# Patient Record
Sex: MALE | Employment: FULL TIME | URBAN - METROPOLITAN AREA
[De-identification: names, ages, dates, MRNs, and addresses within clinical notes are randomized per-mention and may not be internally consistent; named-entity substitution may affect disease eponyms.]

---

## 2021-01-18 ENCOUNTER — MYC MEDICAL ADVICE (OUTPATIENT)
Dept: DERMATOLOGY | Facility: CLINIC | Age: 51
End: 2021-01-18

## 2021-01-18 ENCOUNTER — TELEPHONE (OUTPATIENT)
Dept: DERMATOLOGY | Facility: CLINIC | Age: 51
End: 2021-01-18

## 2021-01-18 ENCOUNTER — APPOINTMENT (OUTPATIENT)
Dept: URGENT CARE | Facility: CLINIC | Age: 51
End: 2021-01-18
Payer: COMMERCIAL

## 2021-01-18 NOTE — TELEPHONE ENCOUNTER
[facility] Call Center    Phone Message    May a detailed message be left on voicemail: yes     Reason for Call: Other: `      Pt diagnosed with Psoriasis and Eczema in 3/2020. Pt did not take action at that time. Pt states he is in a lot of discomfort due to the itching he is experiencing.    I was able to get him an appt on 2/17/21, but with the itching symptoms, Pt requests to be seen sooner.    Pt will upload photos of affected areas by [facility].    Please call Pt to let him know if it is possible to be seen sooner.    Thank you.      Action Taken: Message routed to:  [facility] ([facility]  Derm    Travel Screening: Not Applicable

## 2021-01-18 NOTE — TELEPHONE ENCOUNTER
Phone call to patient. Moved appointment up to this Wednesday, January 20th at 8:30 for a telephone visit with Minal Mae PA-C. Verifico message sent for photos.      Columba Hackett LPN

## 2021-01-20 ENCOUNTER — VIRTUAL VISIT (OUTPATIENT)
Dept: DERMATOLOGY | Facility: CLINIC | Age: 51
End: 2021-01-20
Payer: COMMERCIAL

## 2021-01-20 DIAGNOSIS — L40.0 PLAQUE PSORIASIS: Primary | ICD-10-CM

## 2021-01-20 PROCEDURE — 99203 OFFICE O/P NEW LOW 30 MIN: CPT | Mod: 95 | Performed by: PHYSICIAN ASSISTANT

## 2021-01-20 RX ORDER — TRIAMCINOLONE ACETONIDE 1 MG/G
OINTMENT TOPICAL 2 TIMES DAILY
Qty: 160 G | Refills: 4 | Status: SHIPPED | OUTPATIENT
Start: 2021-01-20

## 2021-01-20 RX ORDER — METFORMIN HCL 500 MG
1000 TABLET, EXTENDED RELEASE 24 HR ORAL
COMMUNITY
Start: 2020-11-10

## 2021-01-20 NOTE — PROGRESS NOTES
"[facility] Dermatology Note  Encounter Date: Jan 20, 2021  [employer] and Telephone ([phone]). Location of teledermatologist: [facility]. Start time: Length of call: 22min.    Dermatology Problem List:  1. Plaque Psoriasis  -triamcinolone 0.1% ointment, hydrocortisone 1% cream  2. PMhx - DM - metformin  _________________________________________    Assessment & Plan:  # Psoriasis. >5% BSA  -continue cerave cleanser  -triamcinolone 0.1% ointment BID  -hydrocortisone 0.1% cream to AA on face  -edu on etiology PSO, relationship with DM  -discuss other treatments in a stepwise fashion depending on how patient responds to the topical steroids. Can consider adding calciurin inhibitors next as well as otezla or biologics, possibly methotrexate pending insurance approval.   -steroid edu given    Procedures Performed:    None    Follow-up: 3 month(s) in-person, or earlier for new or changing lesions    Staff:     All risks, benefits and alternatives were discussed with patient.  Patient is in agreement and understands the assessment and plan.  All questions were answered.    Minal Mae PA-C, MPAS  [facility]: Phone: [phone], Fax: [phone]  [facility]: Phone: [phone],  Fax: [phone]  ____________________________________________    CC: PSO, itchy    HPI:  Mr. Porfirio Knox is a(n) 50 year old male who presents today as a new patient for rash. Dx as PSO vs dermatitis NOS by previous dermatologist via telemedicine visit. Noticed first initially March 2020. Was given calcipotriene solution and cream to use on the patches but did not pick it up due to cost. Continues to be itchy. Has been using plain chapstick on his eyelids. Currently using \"Natralia\" cream, orders it from [employer], says it is used for eczema and psoriasis, Uses Chapstick on eyelids - only thing " "that doesn't irritate them, Uses Vaseline on ears. Previously declined topical steroid use from Dr. Redding. Hx DM, on metformin.     Father with hx of PSO.    Patient is otherwise feeling well, without additional concerns.    Labs:  NA    Physical Exam:  Vitals: There were no vitals taken for this visit.  SKIN: Teledermatology photos were reviewed; image quality and interpretability: acceptable. Image date: see  upload date.  - erythematous scaly plaques on the right and left lower leg and bilateral elbows, thighs.  - Presumably his eyelids as he has been treating them  - No other lesions of concern on areas examined.     Medications:  No current outpatient medications on file.     No current facility-administered medications for this visit.       Past Medical/Surgical History:   There is no problem list on file for this patient.    No past medical history on file.    CC Dr. cMdonald  No address on file on close of this encounter.        Teledermatology Nurse Call Patients:     Are you in the [facility] at the time of the encounter? yes    Today's visit will be billed to you and your insurance.    A teledermatology visit is not as thorough as an in-person visit and the quality of the photograph sent may not be of the same quality as that taken by the dermatology clinic.      Patient summary of issue: Psoriasis/eczema diagnosed early in 2020, but did not act on it - records in [facility]   Location of problem on body: Bilateral legs and left elbow  How long has area/symptoms been present: Early 2020   What makes it better?: \"Natralia\", Cerave body wash specific to psoriasis   What makes it worse?: Showers make the itchiness worse, charcoal based body wash  Other symptoms include the following: Itchiness- most intense after a shower, scaling   Which medications have been tried, for how long, and did they make it better or worse (ex. Triamcinolone, used twice daily for 2 weeks, not " "improved): \"Natralia\", plain Chapstick on eyelids, Vaseline on ears  The patient has seen a dermatologist.   The patient has no past medical history of skin cancer  ROS: The patient is generally feeling well.     -Currently using \"Natralia\" - orders it from [employer], says it is used for eczema and psoriasis   -Uses Chapstick on eyelids - only thing that doesn't irritate them  -Uses Vaseline on ears        Columba Hackett LPN    "

## 2021-01-20 NOTE — LETTER
1/20/2021         RE: Porfirio Knox  [address]  [place] [address]        Dear Colleague,    Thank you for referring your patient, Porfirio Knox, to the [facility]. Please see a copy of my visit note below.    [facility] Dermatology Note  Encounter Date: Jan 20, 2021  [employer] and Telephone ([phone]). Location of teledermatologist: [facility]. Start time: Length of call: 22min.    Dermatology Problem List:  1. Plaque Psoriasis  -triamcinolone 0.1% ointment, hydrocortisone 1% cream  2. PMhx - DM - metformin  _________________________________________    Assessment & Plan:  # Psoriasis. >5% BSA  -continue cerave cleanser  -triamcinolone 0.1% ointment BID  -hydrocortisone 0.1% cream to AA on face  -edu on etiology PSO, relationship with DM  -discuss other treatments in a stepwise fashion depending on how patient responds to the topical steroids. Can consider adding calciurin inhibitors next as well as otezla or biologics, possibly methotrexate pending insurance approval.   -steroid edu given    Procedures Performed:    None    Follow-up: 3 month(s) in-person, or earlier for new or changing lesions    Staff:     All risks, benefits and alternatives were discussed with patient.  Patient is in agreement and understands the assessment and plan.  All questions were answered.    Minal Mae PA-C, MPAS  [facility]: Phone: [phone], Fax: [phone]  [facility]: Phone: [phone],  Fax: [phone]  ____________________________________________    CC: PSO, itchy    HPI:  Mr. Porfirio Knox is a(n) 50 year old male who presents today as a new patient for rash. Dx as PSO vs dermatitis NOS by previous dermatologist via telemedicine visit. Noticed first initially March 2020. Was given calcipotriene solution and cream to use on  "the patches but did not pick it up due to cost. Continues to be itchy. Has been using plain chapstick on his eyelids. Currently using \"Natralia\" cream, orders it from [employer], says it is used for eczema and psoriasis, Uses Chapstick on eyelids - only thing that doesn't irritate them, Uses Vaseline on ears. Previously declined topical steroid use from Dr. Redding. Hx DM, on metformin.     Father with hx of PSO.    Patient is otherwise feeling well, without additional concerns.    Labs:  NA    Physical Exam:  Vitals: There were no vitals taken for this visit.  SKIN: Teledermatology photos were reviewed; image quality and interpretability: acceptable. Image date: see  upload date.  - erythematous scaly plaques on the right and left lower leg and bilateral elbows, thighs.  - Presumably his eyelids as he has been treating them  - No other lesions of concern on areas examined.     Medications:  No current outpatient medications on file.     No current facility-administered medications for this visit.       Past Medical/Surgical History:   There is no problem list on file for this patient.    No past medical history on file.    CC Dr. Mcdonald  No address on file on close of this encounter.        Teledermatology Nurse Call Patients:     Are you in the [facility] at the time of the encounter? yes    Today's visit will be billed to you and your insurance.    A teledermatology visit is not as thorough as an in-person visit and the quality of the photograph sent may not be of the same quality as that taken by the dermatology clinic.      Patient summary of issue: Psoriasis/eczema diagnosed early in 2020, but did not act on it - records in [facility]   Location of problem on body: Bilateral legs and left elbow  How long has area/symptoms been present: Early 2020   What makes it better?: \"Natralia\", Cerave body wash specific to psoriasis   What makes it worse?: Showers make the itchiness worse, " "charcoal based body wash  Other symptoms include the following: Itchiness- most intense after a shower, scaling   Which medications have been tried, for how long, and did they make it better or worse (ex. Triamcinolone, used twice daily for 2 weeks, not improved): \"Natralia\", plain Chapstick on eyelids, Vaseline on ears  The patient has seen a dermatologist.   The patient has no past medical history of skin cancer  ROS: The patient is generally feeling well.     -Currently using \"Natralia\" - orders it from [employer], says it is used for eczema and psoriasis   -Uses Chapstick on eyelids - only thing that doesn't irritate them  -Uses Vaseline on ears        Columba Hackett LPN        Again, thank you for allowing me to participate in the care of your patient.        Sincerely,        Minal Mae PA-C    "

## 2021-01-21 ENCOUNTER — MYC MEDICAL ADVICE (OUTPATIENT)
Dept: DERMATOLOGY | Facility: CLINIC | Age: 51
End: 2021-01-21

## 2021-02-01 NOTE — TELEPHONE ENCOUNTER
[facility] Call Center    Phone Message    May a detailed message be left on voicemail: yes     Reason for Call: Medication Refill Request    Requesting early fill for the kenalog. Patients dog got his tube of med. pls send to [facility] in \[facility]\"" off of [address].     Action Taken: Message routed to:  Adult Clinics: Dermatology p [id number]    Travel Screening: Not Applicable

## 2021-02-01 NOTE — TELEPHONE ENCOUNTER
See Scandid message sent to pt. According to pt's chart, pt should still have refills available at the pharmacy...Marimar Christopher RN

## 2021-02-02 NOTE — TELEPHONE ENCOUNTER
Patient received 2 tubes of triamcinolone ointment.  His dog got into one of the tubes and he requested a refill through the automated option at his pharmacy.  This was denied as it is too soon to refill.    When I asked patient if he spoke directly with the pharmacist he said, no he just used the automated refill line.  I provided him with the pharmacy phone number and option to push to speak with the pharmacy.  They will be able to work with his insurance to hopefully obtain an override for this circumstance.    Lashawn Resendiz RN

## 2021-02-02 NOTE — TELEPHONE ENCOUNTER
[facility] Call Center    Phone Message    May a detailed message be left on voicemail: yes     Reason for Call: Medication Refill Request    Has the patient contacted the pharmacy for the refill? Yes   Name of medication being requested: triamcinolone (KENALOG) 0.1 % external ointment [25406] (Order [id number])  [facility] tells pt it is too soon to refill this ?????  Provider who prescribed the medication: Minal Mae  Pharmacy: [facility]  Date medication is needed: ASAP       Action Taken: Message routed to:  Adult Clinics: Dermatology p [id number]    Travel Screening: Not Applicable                                                                    2

## 2021-03-07 ENCOUNTER — HEALTH MAINTENANCE LETTER (OUTPATIENT)
Age: 51
End: 2021-03-07

## 2021-10-11 ENCOUNTER — HEALTH MAINTENANCE LETTER (OUTPATIENT)
Age: 51
End: 2021-10-11

## 2022-01-01 ENCOUNTER — HEALTH MAINTENANCE LETTER (OUTPATIENT)
Age: 52
End: 2022-01-01

## 2022-03-27 ENCOUNTER — HEALTH MAINTENANCE LETTER (OUTPATIENT)
Age: 52
End: 2022-03-27

## 2023-01-01 ENCOUNTER — HEALTH MAINTENANCE LETTER (OUTPATIENT)
Age: 53
End: 2023-01-01